# Patient Record
Sex: FEMALE | Race: WHITE | NOT HISPANIC OR LATINO
[De-identification: names, ages, dates, MRNs, and addresses within clinical notes are randomized per-mention and may not be internally consistent; named-entity substitution may affect disease eponyms.]

---

## 2018-11-20 ENCOUNTER — RECORD ABSTRACTING (OUTPATIENT)
Age: 83
End: 2018-11-20

## 2018-11-20 DIAGNOSIS — D50.8 OTHER IRON DEFICIENCY ANEMIAS: ICD-10-CM

## 2018-11-20 DIAGNOSIS — J02.9 ACUTE PHARYNGITIS, UNSPECIFIED: ICD-10-CM

## 2018-11-20 DIAGNOSIS — T62.91XA TOXIC EFFECT OF UNSPECIFIED NOXIOUS SUBSTANCE EATEN AS FOOD, ACCIDENTAL (UNINTENTIONAL), INITIAL ENCOUNTER: ICD-10-CM

## 2018-11-20 DIAGNOSIS — K80.20 CALCULUS OF GALLBLADDER W/OUT CHOLECYSTITIS W/OUT OBSTRUCTION: ICD-10-CM

## 2018-11-20 DIAGNOSIS — Z87.19 PERSONAL HISTORY OF OTHER DISEASES OF THE DIGESTIVE SYSTEM: ICD-10-CM

## 2018-11-20 DIAGNOSIS — M54.5 LOW BACK PAIN: ICD-10-CM

## 2018-11-20 DIAGNOSIS — J18.9 PNEUMONIA, UNSPECIFIED ORGANISM: ICD-10-CM

## 2018-11-20 DIAGNOSIS — M41.9 SCOLIOSIS, UNSPECIFIED: ICD-10-CM

## 2018-11-20 DIAGNOSIS — K80.50 CALCULUS OF BILE DUCT W/OUT CHOLANGITIS OR CHOLECYSTITIS W/OUT OBSTRUCTION: ICD-10-CM

## 2018-11-20 DIAGNOSIS — J38.7 OTHER DISEASES OF LARYNX: ICD-10-CM

## 2018-12-03 ENCOUNTER — RX RENEWAL (OUTPATIENT)
Age: 83
End: 2018-12-03

## 2018-12-12 ENCOUNTER — APPOINTMENT (OUTPATIENT)
Dept: GASTROENTEROLOGY | Facility: CLINIC | Age: 83
End: 2018-12-12

## 2019-03-01 ENCOUNTER — APPOINTMENT (OUTPATIENT)
Dept: INTERNAL MEDICINE | Facility: CLINIC | Age: 84
End: 2019-03-01
Payer: MEDICARE

## 2019-03-01 ENCOUNTER — LABORATORY RESULT (OUTPATIENT)
Age: 84
End: 2019-03-01

## 2019-03-01 VITALS
DIASTOLIC BLOOD PRESSURE: 62 MMHG | TEMPERATURE: 99 F | SYSTOLIC BLOOD PRESSURE: 118 MMHG | HEIGHT: 55 IN | WEIGHT: 90 LBS | BODY MASS INDEX: 20.83 KG/M2

## 2019-03-01 DIAGNOSIS — B34.9 VIRAL INFECTION, UNSPECIFIED: ICD-10-CM

## 2019-03-01 DIAGNOSIS — Z87.09 PERSONAL HISTORY OF OTHER DISEASES OF THE RESPIRATORY SYSTEM: ICD-10-CM

## 2019-03-01 PROCEDURE — 36415 COLL VENOUS BLD VENIPUNCTURE: CPT

## 2019-03-01 PROCEDURE — 99213 OFFICE O/P EST LOW 20 MIN: CPT | Mod: 25

## 2019-03-01 NOTE — PHYSICAL EXAM
[No Acute Distress] : no acute distress [Well Nourished] : well nourished [Well Developed] : well developed [Well-Appearing] : well-appearing [Normal Voice/Communication] : normal voice/communication [Normal Sclera/Conjunctiva] : normal sclera/conjunctiva [PERRL] : pupils equal round and reactive to light [EOMI] : extraocular movements intact [Normal Outer Ear/Nose] : the outer ears and nose were normal in appearance [Normal Oropharynx] : the oropharynx was normal [No JVD] : no jugular venous distention [Supple] : supple [No Lymphadenopathy] : no lymphadenopathy [Thyroid Normal, No Nodules] : the thyroid was normal and there were no nodules present [No Respiratory Distress] : no respiratory distress  [Clear to Auscultation] : lungs were clear to auscultation bilaterally [No Accessory Muscle Use] : no accessory muscle use [Normal Rate] : normal rate  [Regular Rhythm] : with a regular rhythm [Normal S1, S2] : normal S1 and S2 [No Murmur] : no murmur heard [No Carotid Bruits] : no carotid bruits [No Abdominal Bruit] : a ~M bruit was not heard ~T in the abdomen [No Varicosities] : no varicosities [Pedal Pulses Present] : the pedal pulses are present [No Edema] : there was no peripheral edema [No Extremity Clubbing/Cyanosis] : no extremity clubbing/cyanosis [No Palpable Aorta] : no palpable aorta [Soft] : abdomen soft [Non Tender] : non-tender [Non-distended] : non-distended [No Masses] : no abdominal mass palpated [No HSM] : no HSM [Normal Bowel Sounds] : normal bowel sounds [Normal Posterior Cervical Nodes] : no posterior cervical lymphadenopathy [Normal Anterior Cervical Nodes] : no anterior cervical lymphadenopathy [No CVA Tenderness] : no CVA  tenderness [No Spinal Tenderness] : no spinal tenderness [No Joint Swelling] : no joint swelling [Grossly Normal Strength/Tone] : grossly normal strength/tone [No Rash] : no rash [Normal Gait] : normal gait [Coordination Grossly Intact] : coordination grossly intact [No Focal Deficits] : no focal deficits [Deep Tendon Reflexes (DTR)] : deep tendon reflexes were 2+ and symmetric [Normal Affect] : the affect was normal [Normal Insight/Judgement] : insight and judgment were intact [de-identified] : Generalized malaise

## 2019-03-01 NOTE — HISTORY OF PRESENT ILLNESS
[FreeTextEntry8] : Patient is a 89-year-old female presenting with a one-week history of generalized malaise, loss of appetite, and fatigue. She had a temperature of 100 one as recorded by her daughter. In the morning. Today. She had no temperature. The patient feels fatigued and does have a loss of appetite. She's recently been hospitalized and undergone a course of antibiotics for an infected stent.she discharge from the hospital ands doing well until approximatea  ago. She is 89 years old. Completely intact and Very vital

## 2019-03-02 LAB
BASOPHILS # BLD AUTO: 0.03 K/UL
BASOPHILS NFR BLD AUTO: 0.3 %
EOSINOPHIL # BLD AUTO: 0.11 K/UL
EOSINOPHIL NFR BLD AUTO: 1 %
HCT VFR BLD CALC: 34 %
HGB BLD-MCNC: 11 G/DL
IMM GRANULOCYTES NFR BLD AUTO: 0.3 %
LYMPHOCYTES # BLD AUTO: 0.59 K/UL
LYMPHOCYTES NFR BLD AUTO: 5.2 %
MAN DIFF?: NORMAL
MCHC RBC-ENTMCNC: 30.1 PG
MCHC RBC-ENTMCNC: 32.4 GM/DL
MCV RBC AUTO: 92.9 FL
MONOCYTES # BLD AUTO: 1.07 K/UL
MONOCYTES NFR BLD AUTO: 9.4 %
NEUTROPHILS # BLD AUTO: 9.59 K/UL
NEUTROPHILS NFR BLD AUTO: 83.8 %
PLATELET # BLD AUTO: 345 K/UL
RBC # BLD: 3.66 M/UL
RBC # FLD: 13.7 %
WBC # FLD AUTO: 11.42 K/UL

## 2019-03-04 LAB
ALBUMIN MFR SERPL ELPH: 45.5 %
ALBUMIN SERPL ELPH-MCNC: 3.7 G/DL
ALBUMIN SERPL-MCNC: 3.2 G/DL
ALBUMIN/GLOB SERPL: 0.8 RATIO
ALP BLD-CCNC: 122 U/L
ALPHA1 GLOB MFR SERPL ELPH: 10.1 %
ALPHA1 GLOB SERPL ELPH-MCNC: 0.7 G/DL
ALPHA2 GLOB MFR SERPL ELPH: 14.9 %
ALPHA2 GLOB SERPL ELPH-MCNC: 1.1 G/DL
ALT SERPL-CCNC: 17 U/L
ANION GAP SERPL CALC-SCNC: 14 MMOL/L
AST SERPL-CCNC: 17 U/L
B-GLOBULIN MFR SERPL ELPH: 13.6 %
B-GLOBULIN SERPL ELPH-MCNC: 1 G/DL
BACTERIA UR CULT: NORMAL
BILIRUB SERPL-MCNC: 0.3 MG/DL
BUN SERPL-MCNC: 18 MG/DL
CALCIUM SERPL-MCNC: 9.3 MG/DL
CHLORIDE SERPL-SCNC: 95 MMOL/L
CO2 SERPL-SCNC: 26 MMOL/L
CREAT SERPL-MCNC: 0.76 MG/DL
CRP SERPL-MCNC: 27.08 MG/DL
ERYTHROCYTE [SEDIMENTATION RATE] IN BLOOD BY WESTERGREN METHOD: 95 MM/HR
FLU A RESULT: NOT DETECTED
FLU B RESULT: NOT DETECTED
GAMMA GLOB FLD ELPH-MCNC: 1.1 G/DL
GAMMA GLOB MFR SERPL ELPH: 15.9 %
GLUCOSE SERPL-MCNC: 154 MG/DL
INTERPRETATION SERPL IEP-IMP: NORMAL
POTASSIUM SERPL-SCNC: 4.8 MMOL/L
PROT SERPL-MCNC: 7.1 G/DL
RSV RESULT: NOT DETECTED
SODIUM SERPL-SCNC: 135 MMOL/L

## 2019-03-06 LAB
APPEARANCE: ABNORMAL
BILIRUBIN URINE: NEGATIVE
BLOOD URINE: NEGATIVE
COLOR: YELLOW
GLUCOSE QUALITATIVE U: NEGATIVE
KETONES URINE: NEGATIVE
LEUKOCYTE ESTERASE URINE: ABNORMAL
NITRITE URINE: NEGATIVE
PH URINE: 6
PROTEIN URINE: ABNORMAL
SPECIFIC GRAVITY URINE: 1.03
UROBILINOGEN URINE: ABNORMAL

## 2019-03-18 ENCOUNTER — RX RENEWAL (OUTPATIENT)
Age: 84
End: 2019-03-18

## 2019-05-20 ENCOUNTER — RX RENEWAL (OUTPATIENT)
Age: 84
End: 2019-05-20

## 2019-10-01 ENCOUNTER — OTHER (OUTPATIENT)
Age: 84
End: 2019-10-01

## 2019-10-02 ENCOUNTER — LABORATORY RESULT (OUTPATIENT)
Age: 84
End: 2019-10-02

## 2019-10-02 ENCOUNTER — RESULT REVIEW (OUTPATIENT)
Age: 84
End: 2019-10-02

## 2019-10-02 ENCOUNTER — APPOINTMENT (OUTPATIENT)
Dept: INTERNAL MEDICINE | Facility: CLINIC | Age: 84
End: 2019-10-02
Payer: MEDICARE

## 2019-10-02 VITALS
SYSTOLIC BLOOD PRESSURE: 110 MMHG | DIASTOLIC BLOOD PRESSURE: 60 MMHG | HEIGHT: 55 IN | WEIGHT: 90 LBS | BODY MASS INDEX: 20.83 KG/M2

## 2019-10-02 DIAGNOSIS — E86.0 DEHYDRATION: ICD-10-CM

## 2019-10-02 DIAGNOSIS — E78.5 HYPERLIPIDEMIA, UNSPECIFIED: ICD-10-CM

## 2019-10-02 PROCEDURE — 99213 OFFICE O/P EST LOW 20 MIN: CPT | Mod: 25

## 2019-10-02 PROCEDURE — 36415 COLL VENOUS BLD VENIPUNCTURE: CPT

## 2019-10-03 PROBLEM — E86.0 DEHYDRATION: Status: ACTIVE | Noted: 2018-11-20

## 2019-10-03 LAB
ALBUMIN MFR SERPL ELPH: 48.9 %
ALBUMIN SERPL ELPH-MCNC: 3.9 G/DL
ALBUMIN SERPL-MCNC: 3.4 G/DL
ALBUMIN/GLOB SERPL: 1 RATIO
ALP BLD-CCNC: 78 U/L
ALPHA1 GLOB MFR SERPL ELPH: 7.3 %
ALPHA1 GLOB SERPL ELPH-MCNC: 0.5 G/DL
ALPHA2 GLOB MFR SERPL ELPH: 10.9 %
ALPHA2 GLOB SERPL ELPH-MCNC: 0.8 G/DL
ALT SERPL-CCNC: 8 U/L
ANION GAP SERPL CALC-SCNC: 13 MMOL/L
AST SERPL-CCNC: 16 U/L
B-GLOBULIN MFR SERPL ELPH: 13.4 %
B-GLOBULIN SERPL ELPH-MCNC: 0.9 G/DL
BILIRUB SERPL-MCNC: 0.3 MG/DL
BUN SERPL-MCNC: 12 MG/DL
CALCIUM SERPL-MCNC: 9.3 MG/DL
CHLORIDE SERPL-SCNC: 97 MMOL/L
CHOLEST SERPL-MCNC: 162 MG/DL
CHOLEST/HDLC SERPL: 2.7 RATIO
CO2 SERPL-SCNC: 26 MMOL/L
CREAT SERPL-MCNC: 0.71 MG/DL
ERYTHROCYTE [SEDIMENTATION RATE] IN BLOOD BY WESTERGREN METHOD: 90 MM/HR
GAMMA GLOB FLD ELPH-MCNC: 1.3 G/DL
GAMMA GLOB MFR SERPL ELPH: 19.5 %
GLUCOSE SERPL-MCNC: 108 MG/DL
HDLC SERPL-MCNC: 61 MG/DL
INTERPRETATION SERPL IEP-IMP: NORMAL
IRON SATN MFR SERPL: 2 %
IRON SERPL-MCNC: 9 UG/DL
LDLC SERPL CALC-MCNC: 87 MG/DL
POTASSIUM SERPL-SCNC: 4.8 MMOL/L
PROT SERPL-MCNC: 6.9 G/DL
PROT SERPL-MCNC: 6.9 G/DL
PROT SERPL-MCNC: 7 G/DL
SODIUM SERPL-SCNC: 136 MMOL/L
TIBC SERPL-MCNC: 379 UG/DL
TRIGL SERPL-MCNC: 72 MG/DL
UIBC SERPL-MCNC: 370 UG/DL

## 2019-10-03 NOTE — HISTORY OF PRESENT ILLNESS
[FreeTextEntry8] : partient c/o lightheadedness weakness   also dark stools past couple days appears very pale  nl heart rate without postural changes \par appears anemic blood being drawn . pt advised to go to the ER if symptoms persist.

## 2019-10-03 NOTE — PHYSICAL EXAM
[No Acute Distress] : no acute distress [Well Nourished] : well nourished [Well Developed] : well developed [Well-Appearing] : well-appearing [Normal Sclera/Conjunctiva] : normal sclera/conjunctiva [PERRL] : pupils equal round and reactive to light [EOMI] : extraocular movements intact [Normal Outer Ear/Nose] : the outer ears and nose were normal in appearance [Normal Oropharynx] : the oropharynx was normal [No JVD] : no jugular venous distention [No Lymphadenopathy] : no lymphadenopathy [Supple] : supple [Thyroid Normal, No Nodules] : the thyroid was normal and there were no nodules present [No Respiratory Distress] : no respiratory distress  [No Accessory Muscle Use] : no accessory muscle use [Clear to Auscultation] : lungs were clear to auscultation bilaterally [Normal Rate] : normal rate  [Regular Rhythm] : with a regular rhythm [Normal S1, S2] : normal S1 and S2 [No Murmur] : no murmur heard [No Carotid Bruits] : no carotid bruits [No Abdominal Bruit] : a ~M bruit was not heard ~T in the abdomen [No Varicosities] : no varicosities [Pedal Pulses Present] : the pedal pulses are present [No Edema] : there was no peripheral edema [No Palpable Aorta] : no palpable aorta [No Extremity Clubbing/Cyanosis] : no extremity clubbing/cyanosis [Soft] : abdomen soft [Non Tender] : non-tender [Non-distended] : non-distended [No Masses] : no abdominal mass palpated [Normal Bowel Sounds] : normal bowel sounds [No HSM] : no HSM [Normal Posterior Cervical Nodes] : no posterior cervical lymphadenopathy [Normal Anterior Cervical Nodes] : no anterior cervical lymphadenopathy [No CVA Tenderness] : no CVA  tenderness [No Spinal Tenderness] : no spinal tenderness [No Joint Swelling] : no joint swelling [Grossly Normal Strength/Tone] : grossly normal strength/tone [No Rash] : no rash [Coordination Grossly Intact] : coordination grossly intact [No Focal Deficits] : no focal deficits [Normal Gait] : normal gait [Deep Tendon Reflexes (DTR)] : deep tendon reflexes were 2+ and symmetric [Normal Affect] : the affect was normal [Normal Insight/Judgement] : insight and judgment were intact [de-identified] : kaylyn mohamudction

## 2019-10-04 ENCOUNTER — APPOINTMENT (OUTPATIENT)
Dept: GASTROENTEROLOGY | Facility: HOSPITAL | Age: 84
End: 2019-10-04

## 2019-10-22 ENCOUNTER — APPOINTMENT (OUTPATIENT)
Dept: INTERNAL MEDICINE | Facility: CLINIC | Age: 84
End: 2019-10-22
Payer: MEDICARE

## 2019-10-22 VITALS
WEIGHT: 90 LBS | DIASTOLIC BLOOD PRESSURE: 60 MMHG | HEIGHT: 55 IN | SYSTOLIC BLOOD PRESSURE: 100 MMHG | BODY MASS INDEX: 20.83 KG/M2

## 2019-10-22 DIAGNOSIS — D50.0 IRON DEFICIENCY ANEMIA SECONDARY TO BLOOD LOSS (CHRONIC): ICD-10-CM

## 2019-10-22 PROCEDURE — 99213 OFFICE O/P EST LOW 20 MIN: CPT

## 2019-10-22 RX ORDER — FERROUS SULFATE 325(65) MG
325 (65 FE) TABLET ORAL
Refills: 0 | Status: ACTIVE | COMMUNITY

## 2019-10-22 NOTE — HISTORY OF PRESENT ILLNESS
[FreeTextEntry8] : Patient is presenting at the age of 89 after having been hospitalized with a profound iron deficiency anemia. She had an upper and lower endoscopy, which was largely unrevealing. She has a past history of severe anemia and GI bleeding. At one point in time obviously, upper GI with reflux esophagitis. This time. There was no evidence of same. At this time. She has no shortness of breath. Her sense of well-being is excellent. Her appetite is good. She is eating well. Her bowel movements are normal. She's taking iron twice a day. She was transfused during her last hospitalization. She'll followup for repeat CBC and iron studies in approximately a month.

## 2019-10-22 NOTE — PHYSICAL EXAM
[No Acute Distress] : no acute distress [Well Nourished] : well nourished [Well Developed] : well developed [Well-Appearing] : well-appearing [Normal Sclera/Conjunctiva] : normal sclera/conjunctiva [PERRL] : pupils equal round and reactive to light [EOMI] : extraocular movements intact [Normal Outer Ear/Nose] : the outer ears and nose were normal in appearance [Normal Oropharynx] : the oropharynx was normal [No JVD] : no jugular venous distention [No Lymphadenopathy] : no lymphadenopathy [Supple] : supple [No Respiratory Distress] : no respiratory distress  [Thyroid Normal, No Nodules] : the thyroid was normal and there were no nodules present [No Accessory Muscle Use] : no accessory muscle use [Clear to Auscultation] : lungs were clear to auscultation bilaterally [Normal Rate] : normal rate  [Regular Rhythm] : with a regular rhythm [Normal S1, S2] : normal S1 and S2 [No Murmur] : no murmur heard [No Carotid Bruits] : no carotid bruits [No Abdominal Bruit] : a ~M bruit was not heard ~T in the abdomen [No Varicosities] : no varicosities [Pedal Pulses Present] : the pedal pulses are present [No Edema] : there was no peripheral edema [No Palpable Aorta] : no palpable aorta [No Extremity Clubbing/Cyanosis] : no extremity clubbing/cyanosis [Soft] : abdomen soft [Non Tender] : non-tender [Non-distended] : non-distended [No Masses] : no abdominal mass palpated [No HSM] : no HSM [Normal Bowel Sounds] : normal bowel sounds [Normal Posterior Cervical Nodes] : no posterior cervical lymphadenopathy [Normal Anterior Cervical Nodes] : no anterior cervical lymphadenopathy [No CVA Tenderness] : no CVA  tenderness [No Spinal Tenderness] : no spinal tenderness [No Joint Swelling] : no joint swelling [Grossly Normal Strength/Tone] : grossly normal strength/tone [No Rash] : no rash [Coordination Grossly Intact] : coordination grossly intact [No Focal Deficits] : no focal deficits [Normal Gait] : normal gait [Normal Affect] : the affect was normal [Deep Tendon Reflexes (DTR)] : deep tendon reflexes were 2+ and symmetric [Normal Insight/Judgement] : insight and judgment were intact

## 2019-10-28 ENCOUNTER — CLINICAL ADVICE (OUTPATIENT)
Age: 84
End: 2019-10-28

## 2020-01-22 ENCOUNTER — APPOINTMENT (OUTPATIENT)
Dept: INTERNAL MEDICINE | Facility: CLINIC | Age: 85
End: 2020-01-22

## 2020-01-24 ENCOUNTER — TRANSCRIPTION ENCOUNTER (OUTPATIENT)
Age: 85
End: 2020-01-24

## 2020-05-04 ENCOUNTER — RX RENEWAL (OUTPATIENT)
Age: 85
End: 2020-05-04

## 2020-05-15 ENCOUNTER — RX RENEWAL (OUTPATIENT)
Age: 85
End: 2020-05-15

## 2020-05-17 ENCOUNTER — RX CHANGE (OUTPATIENT)
Age: 85
End: 2020-05-17

## 2020-06-11 RX ORDER — ESOMEPRAZOLE MAGNESIUM 20 MG/1
20 CAPSULE, DELAYED RELEASE ORAL TWICE DAILY
Qty: 180 | Refills: 3 | Status: ACTIVE | COMMUNITY
Start: 2020-05-17 | End: 1900-01-01

## 2020-10-27 ENCOUNTER — APPOINTMENT (OUTPATIENT)
Dept: INTERNAL MEDICINE | Facility: CLINIC | Age: 85
End: 2020-10-27
Payer: MEDICARE

## 2020-10-27 VITALS
BODY MASS INDEX: 20.83 KG/M2 | SYSTOLIC BLOOD PRESSURE: 102 MMHG | HEIGHT: 55 IN | DIASTOLIC BLOOD PRESSURE: 58 MMHG | WEIGHT: 90 LBS

## 2020-10-27 DIAGNOSIS — Z01.818 ENCOUNTER FOR OTHER PREPROCEDURAL EXAMINATION: ICD-10-CM

## 2020-10-27 DIAGNOSIS — R49.0 DYSPHONIA: ICD-10-CM

## 2020-10-27 PROCEDURE — 99072 ADDL SUPL MATRL&STAF TM PHE: CPT

## 2020-10-27 PROCEDURE — 99214 OFFICE O/P EST MOD 30 MIN: CPT | Mod: 25

## 2020-10-27 NOTE — REVIEW OF SYSTEMS
[Constipation] : constipation [Back Pain] : back pain [Negative] : Heme/Lymph [FreeTextEntry7] : chronic constipation

## 2020-10-27 NOTE — HISTORY OF PRESENT ILLNESS
[FreeTextEntry8] : Patient is a 89-year-old female presenting for surgical clearance for kyphoplasty secondary osteoporosis. Patient is healthy and has been for years. On her present blood work shows a mild thrombocytopenia, probably related to platelet clumping. Her hematocrit is, good. She's been on iron deficient in the past. She has a problem with chronic constipation , particularly if given analgesics like tramadol at this point in time. Her bowels are in relatively good shape. The patient at this time is pretty much wheelchair ridden and in relatively frequent pain. And, should have a good response from the procedure. She is not a candidate for general anesthesia, but certainly given the relatively noninvasive nature of the procedure. She should do well and is surgically cleared. Her blood work is good. Her EKG demonstrates short MT interval, but this is long-standing without other changes. A urine culture is being sent.

## 2020-10-27 NOTE — PHYSICAL EXAM
[No Acute Distress] : no acute distress [Well Nourished] : well nourished [Well Developed] : well developed [Well-Appearing] : well-appearing [Normal Sclera/Conjunctiva] : normal sclera/conjunctiva [PERRL] : pupils equal round and reactive to light [EOMI] : extraocular movements intact [Normal Outer Ear/Nose] : the outer ears and nose were normal in appearance [Normal Oropharynx] : the oropharynx was normal [No JVD] : no jugular venous distention [No Lymphadenopathy] : no lymphadenopathy [Supple] : supple [Thyroid Normal, No Nodules] : the thyroid was normal and there were no nodules present [No Respiratory Distress] : no respiratory distress  [No Accessory Muscle Use] : no accessory muscle use [Clear to Auscultation] : lungs were clear to auscultation bilaterally [Normal Rate] : normal rate  [Regular Rhythm] : with a regular rhythm [Normal S1, S2] : normal S1 and S2 [No Murmur] : no murmur heard [No Carotid Bruits] : no carotid bruits [No Abdominal Bruit] : a ~M bruit was not heard ~T in the abdomen [No Varicosities] : no varicosities [Pedal Pulses Present] : the pedal pulses are present [No Edema] : there was no peripheral edema [No Palpable Aorta] : no palpable aorta [No Extremity Clubbing/Cyanosis] : no extremity clubbing/cyanosis [Soft] : abdomen soft [Non Tender] : non-tender [Non-distended] : non-distended [No Masses] : no abdominal mass palpated [No HSM] : no HSM [Normal Bowel Sounds] : normal bowel sounds [Normal Posterior Cervical Nodes] : no posterior cervical lymphadenopathy [Normal Anterior Cervical Nodes] : no anterior cervical lymphadenopathy [No CVA Tenderness] : no CVA  tenderness [No Spinal Tenderness] : no spinal tenderness [No Joint Swelling] : no joint swelling [Grossly Normal Strength/Tone] : grossly normal strength/tone [No Rash] : no rash [Coordination Grossly Intact] : coordination grossly intact [No Focal Deficits] : no focal deficits [Normal Gait] : normal gait [Normal Affect] : the affect was normal [Normal Insight/Judgement] : insight and judgment were intact [de-identified] : Thoracic kyphosis [de-identified] : Mild cogwheeling right arm

## 2020-10-27 NOTE — ASSESSMENT
[FreeTextEntry1] : Patient's 89-year-old female presenting for surgical clearance. 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  postoperative analgesics should be limited to none opioid medications secondary to debilitating constipation

## 2020-10-28 LAB
APPEARANCE: CLEAR
BILIRUBIN URINE: NEGATIVE
BLOOD URINE: NEGATIVE
COLOR: NORMAL
GLUCOSE QUALITATIVE U: NEGATIVE
KETONES URINE: NEGATIVE
LEUKOCYTE ESTERASE URINE: ABNORMAL
NITRITE URINE: NEGATIVE
PH URINE: 6.5
PROTEIN URINE: NEGATIVE
SPECIFIC GRAVITY URINE: 1.01
UROBILINOGEN URINE: NORMAL

## 2020-10-29 LAB — BACTERIA UR CULT: NORMAL

## 2020-12-08 ENCOUNTER — APPOINTMENT (OUTPATIENT)
Dept: INTERNAL MEDICINE | Facility: CLINIC | Age: 85
End: 2020-12-08

## 2020-12-14 ENCOUNTER — APPOINTMENT (OUTPATIENT)
Dept: INTERNAL MEDICINE | Facility: CLINIC | Age: 85
End: 2020-12-14
Payer: MEDICARE

## 2020-12-14 VITALS
DIASTOLIC BLOOD PRESSURE: 68 MMHG | HEIGHT: 55 IN | SYSTOLIC BLOOD PRESSURE: 112 MMHG | WEIGHT: 90 LBS | BODY MASS INDEX: 20.83 KG/M2

## 2020-12-14 PROCEDURE — 99072 ADDL SUPL MATRL&STAF TM PHE: CPT

## 2020-12-14 PROCEDURE — 99214 OFFICE O/P EST MOD 30 MIN: CPT

## 2020-12-14 NOTE — PHYSICAL EXAM
[No Acute Distress] : no acute distress [Well Nourished] : well nourished [Well Developed] : well developed [Well-Appearing] : well-appearing [Normal Sclera/Conjunctiva] : normal sclera/conjunctiva [PERRL] : pupils equal round and reactive to light [EOMI] : extraocular movements intact [Normal Outer Ear/Nose] : the outer ears and nose were normal in appearance [Normal Oropharynx] : the oropharynx was normal [No JVD] : no jugular venous distention [No Lymphadenopathy] : no lymphadenopathy [Supple] : supple [Thyroid Normal, No Nodules] : the thyroid was normal and there were no nodules present [No Respiratory Distress] : no respiratory distress  [No Accessory Muscle Use] : no accessory muscle use [Clear to Auscultation] : lungs were clear to auscultation bilaterally [Normal Rate] : normal rate  [Regular Rhythm] : with a regular rhythm [Normal S1, S2] : normal S1 and S2 [No Murmur] : no murmur heard [No Carotid Bruits] : no carotid bruits [No Abdominal Bruit] : a ~M bruit was not heard ~T in the abdomen [No Varicosities] : no varicosities [Pedal Pulses Present] : the pedal pulses are present [No Edema] : there was no peripheral edema [No Palpable Aorta] : no palpable aorta [No Extremity Clubbing/Cyanosis] : no extremity clubbing/cyanosis [Soft] : abdomen soft [Non Tender] : non-tender [Non-distended] : non-distended [No Masses] : no abdominal mass palpated [No HSM] : no HSM [Normal Bowel Sounds] : normal bowel sounds [Normal Posterior Cervical Nodes] : no posterior cervical lymphadenopathy [Normal Anterior Cervical Nodes] : no anterior cervical lymphadenopathy [No CVA Tenderness] : no CVA  tenderness [No Spinal Tenderness] : no spinal tenderness [No Joint Swelling] : no joint swelling [Grossly Normal Strength/Tone] : grossly normal strength/tone [No Rash] : no rash [Coordination Grossly Intact] : coordination grossly intact [No Focal Deficits] : no focal deficits [Normal Gait] : normal gait [Normal Affect] : the affect was normal [Normal Insight/Judgement] : insight and judgment were intact [de-identified] : Thoracic kyphosis [de-identified] : Mild cogwheeling right arm

## 2020-12-14 NOTE — ASSESSMENT
[FreeTextEntry4] : Patient is free of infectious disease, cardiac and pulmonary status are stable. Blood work is excellent. Patient cleared for anticipated procedure.

## 2020-12-14 NOTE — HISTORY OF PRESENT ILLNESS
[FreeTextEntry1] : kyphoplasty [FreeTextEntry4] : Patient is a 89-year-old female presented for surgical clearance for a kyphoplasty. She has an extremely pain for compression fracture that has markedly limited. Both her ability to move and is related to relatively marked deterioration in her quality of life. She's elected to have a kyphoplasty which probably will work very well. Her MRI strongly suggested that he could she subsequently had slipped out of bed. Once and has had increased pain, but probably not, change. She's presently being reevaluated with a second MRI prior to surgery. At this time. She appears to be free of infectious disease, pulmonary and cardiac status were remarkably good as is her blood work. Patient is cleared for the anticipated procedure.

## 2020-12-16 ENCOUNTER — APPOINTMENT (OUTPATIENT)
Dept: INTERNAL MEDICINE | Facility: CLINIC | Age: 85
End: 2020-12-16

## 2020-12-21 PROBLEM — Z87.09 HISTORY OF UPPER RESPIRATORY INFECTION: Status: RESOLVED | Noted: 2018-11-20 | Resolved: 2020-12-21

## 2021-03-30 ENCOUNTER — NON-APPOINTMENT (OUTPATIENT)
Age: 86
End: 2021-03-30

## 2021-03-30 ENCOUNTER — APPOINTMENT (OUTPATIENT)
Dept: FAMILY MEDICINE | Facility: CLINIC | Age: 86
End: 2021-03-30
Payer: MEDICARE

## 2021-03-30 VITALS
RESPIRATION RATE: 16 BRPM | WEIGHT: 88 LBS | BODY MASS INDEX: 20.37 KG/M2 | OXYGEN SATURATION: 97 % | HEART RATE: 97 BPM | DIASTOLIC BLOOD PRESSURE: 70 MMHG | HEIGHT: 55 IN | SYSTOLIC BLOOD PRESSURE: 108 MMHG

## 2021-03-30 PROCEDURE — 99213 OFFICE O/P EST LOW 20 MIN: CPT

## 2021-03-30 PROCEDURE — 99072 ADDL SUPL MATRL&STAF TM PHE: CPT

## 2021-03-30 RX ORDER — ALBUTEROL SULFATE 90 UG/1
108 (90 BASE) POWDER, METERED RESPIRATORY (INHALATION) EVERY 6 HOURS
Qty: 1 | Refills: 0 | Status: ACTIVE | COMMUNITY
Start: 2021-03-30 | End: 1900-01-01

## 2021-04-01 NOTE — HISTORY OF PRESENT ILLNESS
[FreeTextEntry8] : 89 yo F seeking to meet new doctor and establish care as previous doctor now retiring, Dr. Jaime. Additionally, patient accompanied by daughter who reports that during her physical therapy sessions, patient develops mild wheezing at the end of sessions. Patient currently denies shortness of breath, chest pain, nausea/vomiting, night time symptoms or cough at any other instances. Wheezing does not affect her physical capacity to perform physical thearpy. Denies any dysphagia. Mild subjective wheezing during physical therapy.

## 2021-04-29 ENCOUNTER — RX RENEWAL (OUTPATIENT)
Age: 86
End: 2021-04-29

## 2021-06-03 ENCOUNTER — RX RENEWAL (OUTPATIENT)
Age: 86
End: 2021-06-03

## 2021-06-03 RX ORDER — ALBUTEROL SULFATE 90 UG/1
108 (90 BASE) INHALANT RESPIRATORY (INHALATION)
Qty: 1 | Refills: 0 | Status: ACTIVE | COMMUNITY
Start: 2021-03-30 | End: 1900-01-01

## 2021-06-06 ENCOUNTER — RX RENEWAL (OUTPATIENT)
Age: 86
End: 2021-06-06

## 2021-12-23 ENCOUNTER — NON-APPOINTMENT (OUTPATIENT)
Age: 86
End: 2021-12-23

## 2021-12-27 DIAGNOSIS — R26.9 UNSPECIFIED ABNORMALITIES OF GAIT AND MOBILITY: ICD-10-CM

## 2022-01-01 ENCOUNTER — APPOINTMENT (OUTPATIENT)
Dept: GERIATRICS | Facility: HOME HEALTH | Age: 87
End: 2022-01-01

## 2022-01-01 ENCOUNTER — LABORATORY RESULT (OUTPATIENT)
Age: 87
End: 2022-01-01

## 2022-01-01 ENCOUNTER — RX RENEWAL (OUTPATIENT)
Age: 87
End: 2022-01-01

## 2022-01-01 ENCOUNTER — APPOINTMENT (OUTPATIENT)
Dept: GERIATRICS | Facility: CLINIC | Age: 87
End: 2022-01-01
Payer: MEDICARE

## 2022-01-01 ENCOUNTER — APPOINTMENT (OUTPATIENT)
Dept: GERIATRICS | Facility: HOME HEALTH | Age: 87
End: 2022-01-01
Payer: MEDICARE

## 2022-01-01 ENCOUNTER — RESULT REVIEW (OUTPATIENT)
Age: 87
End: 2022-01-01

## 2022-01-01 ENCOUNTER — NON-APPOINTMENT (OUTPATIENT)
Age: 87
End: 2022-01-01

## 2022-01-01 ENCOUNTER — APPOINTMENT (OUTPATIENT)
Dept: GERIATRICS | Facility: CLINIC | Age: 87
End: 2022-01-01

## 2022-01-01 VITALS
TEMPERATURE: 98.7 F | HEART RATE: 90 BPM | SYSTOLIC BLOOD PRESSURE: 130 MMHG | OXYGEN SATURATION: 95 % | DIASTOLIC BLOOD PRESSURE: 80 MMHG | RESPIRATION RATE: 18 BRPM

## 2022-01-01 VITALS
OXYGEN SATURATION: 92 % | SYSTOLIC BLOOD PRESSURE: 128 MMHG | HEART RATE: 88 BPM | TEMPERATURE: 98 F | DIASTOLIC BLOOD PRESSURE: 78 MMHG | RESPIRATION RATE: 18 BRPM

## 2022-01-01 VITALS — RESPIRATION RATE: 30 BRPM | OXYGEN SATURATION: 92 %

## 2022-01-01 DIAGNOSIS — M62.830 MUSCLE SPASM OF BACK: ICD-10-CM

## 2022-01-01 DIAGNOSIS — R35.0 FREQUENCY OF MICTURITION: ICD-10-CM

## 2022-01-01 DIAGNOSIS — R06.2 WHEEZING: ICD-10-CM

## 2022-01-01 DIAGNOSIS — G20 PARKINSON'S DISEASE: ICD-10-CM

## 2022-01-01 DIAGNOSIS — R30.0 DYSURIA: ICD-10-CM

## 2022-01-01 DIAGNOSIS — K64.9 UNSPECIFIED HEMORRHOIDS: ICD-10-CM

## 2022-01-01 DIAGNOSIS — J18.9 PNEUMONIA, UNSPECIFIED ORGANISM: ICD-10-CM

## 2022-01-01 DIAGNOSIS — K59.09 OTHER CONSTIPATION: ICD-10-CM

## 2022-01-01 DIAGNOSIS — R41.0 DISORIENTATION, UNSPECIFIED: ICD-10-CM

## 2022-01-01 DIAGNOSIS — R63.0 ANOREXIA: ICD-10-CM

## 2022-01-01 DIAGNOSIS — N32.81 OVERACTIVE BLADDER: ICD-10-CM

## 2022-01-01 DIAGNOSIS — S81.802A UNSPECIFIED OPEN WOUND, LEFT LOWER LEG, INITIAL ENCOUNTER: ICD-10-CM

## 2022-01-01 DIAGNOSIS — L03.818 CELLULITIS OF OTHER SITES: ICD-10-CM

## 2022-01-01 DIAGNOSIS — T14.8XXA OTHER INJURY OF UNSPECIFIED BODY REGION, INITIAL ENCOUNTER: ICD-10-CM

## 2022-01-01 PROCEDURE — 99350 HOME/RES VST EST HIGH MDM 60: CPT

## 2022-01-01 PROCEDURE — 99214 OFFICE O/P EST MOD 30 MIN: CPT | Mod: 95

## 2022-01-01 PROCEDURE — 99349 HOME/RES VST EST MOD MDM 40: CPT

## 2022-01-01 PROCEDURE — 99354: CPT

## 2022-01-01 PROCEDURE — 99449 NTRPROF PH1/NTRNET/EHR 31/>: CPT

## 2022-01-01 PROCEDURE — 99442: CPT

## 2022-01-01 RX ORDER — MONTELUKAST 10 MG/1
10 TABLET, FILM COATED ORAL
Qty: 1 | Refills: 5 | Status: ACTIVE | COMMUNITY
Start: 2022-02-04 | End: 1900-01-01

## 2022-01-01 RX ORDER — SENNOSIDES 8.6 MG/1
8.6 TABLET ORAL
Qty: 30 | Refills: 5 | Status: ACTIVE | COMMUNITY
Start: 2022-01-01 | End: 1900-01-01

## 2022-01-01 RX ORDER — FLUTICASONE PROPIONATE 50 UG/1
50 SPRAY, METERED NASAL TWICE DAILY
Qty: 1 | Refills: 5 | Status: ACTIVE | COMMUNITY
Start: 2022-01-24 | End: 1900-01-01

## 2022-01-01 RX ORDER — SULFAMETHOXAZOLE AND TRIMETHOPRIM 800; 160 MG/1; MG/1
800-160 TABLET ORAL TWICE DAILY
Qty: 14 | Refills: 0 | Status: DISCONTINUED | COMMUNITY
Start: 2022-01-01 | End: 2022-01-01

## 2022-01-01 RX ORDER — URSODIOL 300 MG/1
300 CAPSULE ORAL
Qty: 180 | Refills: 3 | Status: ACTIVE | COMMUNITY
Start: 2019-03-18 | End: 1900-01-01

## 2022-01-01 RX ORDER — LIDOCAINE AND PRILOCAINE 25; 25 MG/G; MG/G
2.5-2.5 CREAM TOPICAL
Qty: 2 | Refills: 2 | Status: ACTIVE | COMMUNITY
Start: 2022-01-01 | End: 1900-01-01

## 2022-01-01 RX ORDER — CYCLOBENZAPRINE HYDROCHLORIDE 5 MG/1
5 TABLET, FILM COATED ORAL
Qty: 15 | Refills: 0 | Status: DISCONTINUED | COMMUNITY
Start: 2021-07-30 | End: 2022-01-01

## 2022-01-01 RX ORDER — CEFPODOXIME PROXETIL 100 MG/1
100 TABLET, FILM COATED ORAL TWICE DAILY
Qty: 10 | Refills: 0 | Status: DISCONTINUED | COMMUNITY
Start: 2022-01-01 | End: 2022-01-01

## 2022-01-01 RX ORDER — AZITHROMYCIN 500 MG/1
500 TABLET, FILM COATED ORAL DAILY
Qty: 1 | Refills: 1 | Status: DISCONTINUED | COMMUNITY
Start: 2022-01-25 | End: 2022-01-01

## 2022-01-01 RX ORDER — FAMOTIDINE 20 MG/1
20 TABLET, FILM COATED ORAL DAILY
Qty: 30 | Refills: 5 | Status: ACTIVE | COMMUNITY
Start: 2022-01-01 | End: 1900-01-01

## 2022-01-01 RX ORDER — AMOXICILLIN AND CLAVULANATE POTASSIUM 875; 125 MG/1; MG/1
875-125 TABLET, COATED ORAL
Qty: 14 | Refills: 0 | Status: DISCONTINUED | COMMUNITY
Start: 2022-01-01 | End: 2022-01-01

## 2022-01-01 RX ORDER — MUPIROCIN 2 G/100G
2 CREAM TOPICAL
Qty: 30 | Refills: 0 | Status: ACTIVE | COMMUNITY
Start: 2022-01-01

## 2022-01-01 RX ORDER — MUPIROCIN 20 MG/G
2 OINTMENT TOPICAL
Qty: 2 | Refills: 5 | Status: ACTIVE | COMMUNITY
Start: 2022-01-01 | End: 1900-01-01

## 2022-01-01 RX ORDER — AZITHROMYCIN 250 MG/1
250 TABLET, FILM COATED ORAL
Qty: 1 | Refills: 0 | Status: DISCONTINUED | COMMUNITY
Start: 2022-01-01 | End: 2022-01-01

## 2022-01-01 RX ORDER — SULFAMETHOXAZOLE AND TRIMETHOPRIM 800; 160 MG/1; MG/1
800-160 TABLET ORAL TWICE DAILY
Qty: 14 | Refills: 0 | Status: DISCONTINUED | COMMUNITY
Start: 2022-03-01 | End: 2022-01-01

## 2022-01-01 RX ORDER — FUROSEMIDE 20 MG/1
20 TABLET ORAL
Qty: 30 | Refills: 2 | Status: ACTIVE | COMMUNITY
Start: 2022-01-01 | End: 1900-01-01

## 2022-01-01 RX ORDER — GUAIFENESIN 400 MG/1
400 TABLET ORAL
Qty: 180 | Refills: 3 | Status: ACTIVE | COMMUNITY
Start: 2022-01-01 | End: 1900-01-01

## 2022-01-01 RX ORDER — DIAZEPAM 2 MG/1
2 TABLET ORAL AT BEDTIME
Qty: 15 | Refills: 0 | Status: ACTIVE | COMMUNITY
Start: 2022-01-01 | End: 1900-01-01

## 2022-01-01 RX ORDER — HYDROCORTISONE 25 MG/G
2.5 CREAM TOPICAL
Qty: 30 | Refills: 3 | Status: ACTIVE | COMMUNITY
Start: 2022-01-01 | End: 1900-01-01

## 2022-01-01 RX ORDER — CEFPODOXIME PROXETIL 200 MG/1
200 TABLET, FILM COATED ORAL DAILY
Qty: 5 | Refills: 0 | Status: DISCONTINUED | COMMUNITY
Start: 2022-01-01 | End: 2022-01-01

## 2022-01-01 RX ORDER — TRAMADOL HYDROCHLORIDE 50 MG/1
50 TABLET, COATED ORAL
Qty: 15 | Refills: 0 | Status: DISCONTINUED | COMMUNITY
Start: 2022-02-04 | End: 2022-01-01

## 2022-01-01 RX ORDER — LEVOFLOXACIN 750 MG/1
750 TABLET, FILM COATED ORAL DAILY
Qty: 5 | Refills: 0 | Status: ACTIVE | COMMUNITY
Start: 2022-01-01 | End: 1900-01-01

## 2022-01-01 RX ORDER — CARVEDILOL 3.12 MG/1
3.12 TABLET, FILM COATED ORAL TWICE DAILY
Qty: 60 | Refills: 0 | Status: ACTIVE | COMMUNITY
Start: 2022-01-01 | End: 1900-01-01

## 2022-01-03 ENCOUNTER — NON-APPOINTMENT (OUTPATIENT)
Age: 87
End: 2022-01-03

## 2022-01-08 RX ORDER — ALBUTEROL SULFATE 2.5 MG/3ML
(2.5 MG/3ML) SOLUTION RESPIRATORY (INHALATION) EVERY 6 HOURS
Qty: 1 | Refills: 2 | Status: ACTIVE | COMMUNITY
Start: 2022-01-08 | End: 1900-01-01

## 2022-01-24 DIAGNOSIS — R09.81 NASAL CONGESTION: ICD-10-CM

## 2022-01-24 RX ORDER — FLUTICASONE PROPIONATE AND SALMETEROL 250; 50 UG/1; UG/1
250-50 POWDER RESPIRATORY (INHALATION)
Qty: 1 | Refills: 1 | Status: ACTIVE | COMMUNITY
Start: 2022-01-24 | End: 1900-01-01

## 2022-01-25 ENCOUNTER — APPOINTMENT (OUTPATIENT)
Dept: PULMONOLOGY | Facility: CLINIC | Age: 87
End: 2022-01-25
Payer: MEDICARE

## 2022-01-25 ENCOUNTER — NON-APPOINTMENT (OUTPATIENT)
Age: 87
End: 2022-01-25

## 2022-01-25 PROCEDURE — 99202 OFFICE O/P NEW SF 15 MIN: CPT | Mod: 95

## 2022-01-25 NOTE — HISTORY OF PRESENT ILLNESS
[Home] : at home, [unfilled] , at the time of the visit. [Medical Office: ([facility])___] : at the medical office located in  [Family Member] : family member [Verbal consent obtained from patient] : the patient, [unfilled] [FreeTextEntry1] : I was asked to consult on this 89 y/o F pt for COVID-19 infection. \par Patient's daughter also present. Patient was potentially exposed on 1/1, began showing sx 1/4-5 and finally tested + on 1/8-10. Daughter attests the patient had briefly improved but has now regressed. Daughter reports patient's O2 levels are down around 85-86% and she is getting a lot of mucus and fatigue. Daughter cannot tell if the nebulizer is helpful. Patient has been trying to cough up her phlegm the last couple days, and even w/ Mucinex she has been mostly unable to expectorate any. She was also given Flonase, Emergen-C, and Advair. She is still eating and drinking even though her appetite has been diminished. \par \par Plan: Start Zithro 1 500 mg TAB 3x/day. O2 being dlelivers ASAP, ? more bacterial superinfection than COVID itself.

## 2022-01-26 ENCOUNTER — NON-APPOINTMENT (OUTPATIENT)
Age: 87
End: 2022-01-26

## 2022-01-27 ENCOUNTER — NON-APPOINTMENT (OUTPATIENT)
Age: 87
End: 2022-01-27

## 2022-01-28 ENCOUNTER — NON-APPOINTMENT (OUTPATIENT)
Age: 87
End: 2022-01-28

## 2022-01-31 ENCOUNTER — NON-APPOINTMENT (OUTPATIENT)
Age: 87
End: 2022-01-31

## 2022-02-01 ENCOUNTER — NON-APPOINTMENT (OUTPATIENT)
Age: 87
End: 2022-02-01

## 2022-02-02 ENCOUNTER — NON-APPOINTMENT (OUTPATIENT)
Age: 87
End: 2022-02-02

## 2022-02-04 ENCOUNTER — APPOINTMENT (OUTPATIENT)
Dept: GERIATRICS | Facility: CLINIC | Age: 87
End: 2022-02-04
Payer: MEDICARE

## 2022-02-04 ENCOUNTER — TRANSCRIPTION ENCOUNTER (OUTPATIENT)
Age: 87
End: 2022-02-04

## 2022-02-04 ENCOUNTER — APPOINTMENT (OUTPATIENT)
Dept: PULMONOLOGY | Facility: CLINIC | Age: 87
End: 2022-02-04
Payer: MEDICARE

## 2022-02-04 VITALS — OXYGEN SATURATION: 89 %

## 2022-02-04 DIAGNOSIS — U07.1 COVID-19: ICD-10-CM

## 2022-02-04 DIAGNOSIS — J12.82 COVID-19: ICD-10-CM

## 2022-02-04 DIAGNOSIS — M48.54XA COLLAPSED VERTEBRA, NOT ELSEWHERE CLASSIFIED, THORACIC REGION, INITIAL ENCOUNTER FOR FRACTURE: ICD-10-CM

## 2022-02-04 PROCEDURE — 99211 OFF/OP EST MAY X REQ PHY/QHP: CPT | Mod: 95

## 2022-02-04 PROCEDURE — 99205 OFFICE O/P NEW HI 60 MIN: CPT | Mod: 95

## 2022-02-04 RX ORDER — IPRATROPIUM BROMIDE AND ALBUTEROL SULFATE 2.5; .5 MG/3ML; MG/3ML
0.5-2.5 (3) SOLUTION RESPIRATORY (INHALATION) 3 TIMES DAILY
Qty: 2 | Refills: 5 | Status: ACTIVE | COMMUNITY
Start: 2022-02-04 | End: 1900-01-01

## 2022-02-04 NOTE — HISTORY OF PRESENT ILLNESS
[Home] : at home, [unfilled] , at the time of the visit. [Medical Office: ([facility])___] : at the medical office located in  [Verbal consent obtained from patient] : the patient, [unfilled] [FreeTextEntry1] : Patient and her daughter return on a telehealth f/u for COVID-19 infection. Daughter believes the patient has improved after taking the Zithro despite still having mild cough and mucus. Patient is still on O2. Daughter has noticed the patient's appetite and energy have went "downhill" since having COVID. \par \par Plan: Add Atrovent to albuterol. Start Singulair. If her congestion or her sats do not improve, I'll consider adding Pulmicort. Continue O2 wean as tolerated. I agree w/ PT. She needs to be mobilized in order to clear her secretions and prevent muscle stiffness and tightness.

## 2022-02-07 PROBLEM — U07.1 PNEUMONIA DUE TO COVID-19 VIRUS: Status: ACTIVE | Noted: 2022-01-25

## 2022-02-07 PROBLEM — M48.54XA COMPRESSION FRACTURE OF THORACIC SPINE, NON-TRAUMATIC: Status: ACTIVE | Noted: 2020-10-27

## 2022-02-07 RX ORDER — MIRABEGRON 25 MG/1
25 TABLET, FILM COATED, EXTENDED RELEASE ORAL
Qty: 30 | Refills: 3 | Status: ACTIVE | COMMUNITY
Start: 2022-02-07 | End: 1900-01-01

## 2022-02-07 NOTE — ASSESSMENT
[FreeTextEntry1] : # Back Pain\par - Will re try Cyclobenzaprine- daughter thinks it has worked in the past\par - Will also RX Tramadol PRN\par \par Re start PT with [facility]\par \par Trial of Myrbetriq for OAB- if that isn't covered or is too expensive, will try Ditropan

## 2022-02-07 NOTE — HISTORY OF PRESENT ILLNESS
[Home] : at home, [unfilled] , at the time of the visit. [Medical Office: ([facility])___] : at the medical office located in  [Verbal consent obtained from patient] : the patient, [unfilled] [FreeTextEntry1] : 89 y/o F w/ PMH COPD, recent COVID now on home 02, choledocholithiasis s/p stent '13, recent UTI, OP w/ compression fractures who presents today to establish primary palliative care. \par \par Walks extremely slowly with a walker. Very difficult to get OOB. Sleeps throughout the day and at night. Good appetite but small portions. Has back pain 2/2 compression fractures 2/2 OP for which she takes ES Tylenol- helps somewhat.  \par \par Lately has not been constipation or GERD, which have always been chronic issues for her.\par \par Biggest issue is urinary frequency. Goes every 2 hours. Has been like this for years. She used to get up on her own, but now needs assistance of her daughter. \par \par Patient feels bothered by her difficulty walking. Is waiting on PT to re-start with [facility].

## 2022-02-07 NOTE — PHYSICAL EXAM
[General Appearance - Alert] : alert [General Appearance - In No Acute Distress] : in no acute distress [Sclera] : the sclera and conjunctiva were normal [Normal Oral Mucosa] : normal oral mucosa [Neck Appearance] : the appearance of the neck was normal [] : no respiratory distress [Affect] : the affect was normal

## 2022-03-02 ENCOUNTER — NON-APPOINTMENT (OUTPATIENT)
Age: 87
End: 2022-03-02

## 2022-03-23 PROBLEM — M62.830 MUSCLE SPASM OF BACK: Status: ACTIVE | Noted: 2021-07-30

## 2022-03-23 NOTE — HISTORY OF PRESENT ILLNESS
[Verbal consent obtained from patient] : the patient, [unfilled] [Home] : at home, [unfilled] , at the time of the visit. [Medical Office: ([facility])___] : at the medical office located in  [Family Member] : family member [FreeTextEntry3] : daughter Nan [FreeTextEntry1] : 89 y/o F w/ PMH COPD, recent COVID now on home 02, choledocholithiasis s/p stent '13, recent UTI, OP w/ compression fractures who presents today to for a primary palliative care follow up. \par \par Per daughter, patient is doing "excellent." Has not used oxygen in a while. Recent resting pulse ox was 96%. PT is supposed to come tomorrow. \par \par Back pain is completely gone. Did take the muscle relaxer but no longer relieves it. \par \par Burning on urination is gone. Never tried Myrbetriq, frequency is about the same but is not bothersome to patient or daughter. \par \par Much more alert during the day. \par \par Constipation is under control with "stool softener" and mineral oil. \par \par

## 2022-03-31 PROBLEM — L03.818 CELLULITIS OF OTHER SPECIFIED SITE: Status: ACTIVE | Noted: 2022-01-01

## 2022-03-31 NOTE — ASSESSMENT
[FreeTextEntry1] : wound care\par saline wash 3 x a day\par bactroban TID\par nonadherent gauze with kerlex \par avoid tape\par to contact us if worsening pain, fever, redenss or puss\par f/u home visit with charlie next week\par rx sent to pharmacy in case worsens \par to call with questions before giving abx [Medication Management] : medication management

## 2022-03-31 NOTE — HISTORY OF PRESENT ILLNESS
[FreeTextEntry1] : received a call from pt daughter \par \par jimy fernandez \par pt with large wound from nail scratch on shin \par \par no fever, no chills, no discharge \par doing well no other changes in condition \par \par

## 2022-04-06 NOTE — PHYSICAL EXAM
[FreeTextEntry1] : Wound to left lateral shin visualized- round and covered in 30$ eschar; remainder of wound is granulation tissue/slough. Mosit wound base.

## 2022-05-04 PROBLEM — S81.802A WOUND OF LEFT LOWER EXTREMITY: Status: ACTIVE | Noted: 2022-01-01

## 2022-05-04 NOTE — PHYSICAL EXAM
[FreeTextEntry1] : Wound to left lateral shin visualized- covered in thick scab of dried Triad. Moistened and removed. Base pink and moist with minimal yellow drainage. No surrounding erythemaMosit wound base.

## 2022-05-04 NOTE — ASSESSMENT
[FreeTextEntry1] : \par Saline + Bactroban + Allevyn pads\par \par D/w daughter who voiced understanding\par

## 2022-05-04 NOTE — HISTORY OF PRESENT ILLNESS
[FreeTextEntry1] : 89 y/o F w/ PMH COPD, recent COVID s/p home 02, choledocholithiasis s/p stent '13, recent UTI, OP w/ compression fractures, recent LLE  wound treated for cellulitis + local wound care who presents today for a home visit f/u of LLE wound. \par \par Doing well, no pain, breathing controlled. No new symptoms.

## 2022-05-09 PROBLEM — G20 PARKINSON'S DISEASE: Status: ACTIVE | Noted: 2020-10-27

## 2022-05-09 PROBLEM — T14.8XXA WOUND OF SKIN: Status: ACTIVE | Noted: 2022-01-01

## 2022-05-09 NOTE — PHYSICAL EXAM
[Affect] : the affect was normal [General Appearance - Alert] : alert [General Appearance - In No Acute Distress] : in no acute distress [Sclera] : the sclera and conjunctiva were normal [Normal Oral Mucosa] : normal oral mucosa [No Oral Pallor] : no oral pallor [No Oral Cyanosis] : no oral cyanosis [Outer Ear] : the ears and nose were normal in appearance [Hearing Threshold Finger Rub Not [place]] : hearing was normal [Examination Of The Oral Cavity] : the lips and gums were normal [Nasal Cavity] : the nasal mucosa and septum were normal [Oropharynx] : The oropharynx was normal [Neck Appearance] : the appearance of the neck was normal [Respiration, Rhythm And Depth] : normal respiratory rhythm and effort [Exaggerated Use Of Accessory Muscles For Inspiration] : no accessory muscle use [Auscultation Breath Sounds / Voice Sounds] : lungs were clear to auscultation bilaterally [Heart Rate And Rhythm] : heart rate was normal and rhythm regular [Heart Sounds] : normal S1 and S2 [Full Pulse] : the pedal pulses are present [Edema] : there was no peripheral edema [Bowel Sounds] : normal bowel sounds [Abdomen Soft] : soft [Abdomen Tenderness] : non-tender [] : no hepato-splenomegaly [Abdomen Mass (___ Cm)] : no abdominal mass palpated [Nail Clubbing] : no clubbing  or cyanosis of the fingernails [Involuntary Movements] : no involuntary movements were seen [Cranial Nerves] : cranial nerves 2-12 were intact [Oriented To Time, Place, And Person] : oriented to person, place, and time [Impaired Insight] : insight and judgment were intact [FreeTextEntry1] : + wound on right shin healing well with new eechymosis non tender no edema

## 2022-05-09 NOTE — ASSESSMENT
[Limited] : Treatment Guidelines: Limited [______] : HCP: [unfilled] [Frailty-weight loss of >5%] : frailty-weight loss  [Fall precautions] : fall precautions [Social support] : social support [Living arrangements] : living arrangements [Advanced Directives] : advanced directives [Pain Management] : pain management [Medication Management] : medication management [DNR] : Code Status: DNR [Comfort] : Treatment Guidelines: Comfort [DNI] : Intubation: DNI [FreeTextEntry1] : pt is a 89 y/o F with COPD not on oxygen valvular heart disease,  moderate dementia home bound with wound of LE seen in the home for wound \par \par VNS services d/c pt due to PT services\par to continue triad cream with non adherent gauze and wrap with rolled gauze to prevent adhesive injury as pt with fraility of skin\par f/u in one week as pt to be switched to bactroban and d/c triad will continue for this week and d/c next week\par no pain \par continue PT\par \par f/u in one week for wound care \par ER precautions

## 2022-06-01 NOTE — ASSESSMENT
[DNR] : Code Status: DNR [Limited] : Treatment Guidelines: Limited [DNI] : Intubation: DNI [______] : HCP: [unfilled]

## 2022-06-01 NOTE — PHYSICAL EXAM
[General Appearance - Alert] : alert [General Appearance - In No Acute Distress] : in no acute distress [Normal Oral Mucosa] : normal oral mucosa [No Oral Pallor] : no oral pallor [Neck Appearance] : the appearance of the neck was normal [FreeTextEntry1] : Wound to left lateral shin visualized- covered in thick scab of dried Triad. Moistened and removed. Base pink and moist with minimal yellow drainage. No surrounding erythemaMosit wound base.  [Affect] : the affect was normal

## 2022-06-01 NOTE — HISTORY OF PRESENT ILLNESS
[FreeTextEntry1] : 89 y/o F w/ PMH COPD, recent COVID s/p home 02, choledocholithiasis s/p stent '13, recent UTI, OP w/ compression fractures, recent LLE  wound treated for cellulitis + local wound care who presents today for a primary palliative care f/u\par

## 2022-08-19 PROBLEM — R41.0 CONFUSION: Status: ACTIVE | Noted: 2022-01-01

## 2022-08-19 PROBLEM — R35.0 URINARY FREQUENCY: Status: ACTIVE | Noted: 2022-01-01

## 2022-08-19 PROBLEM — N32.81 OVERACTIVE BLADDER: Status: ACTIVE | Noted: 2022-02-07

## 2022-08-22 NOTE — HISTORY OF PRESENT ILLNESS
[FreeTextEntry1] : pt seen in the home with her daughter jimy present\par pt denies pain \par reprots has new bruise on LE wound\par eating well\par no shortness of breath \par no other complaints at this time\par doing PT in the home\par denies trauma, falls \par no changes in mental status\par daughter concerned as pt wound healing well and now with new bruising denies trauma, has only been using triad with [facility]\par \par \par F/U 08/19/22\par pt seen in the home with her daughter Jimy present\par pt daughter reports pt with lethargy, confusion, dysuria and polyuria \par decreased appetite\par increased weakness\par pt has been participating in rehab in the home\par no other concerns at this time\par  no fever, no chills\par pt reports feeling sleepy and with worsening dysuria \par last time felt this had"really bad infection in my urine"

## 2022-08-22 NOTE — ASSESSMENT
[FreeTextEntry1] : pt with history of COPD, history of COVID infection with hypoxia and required oxygen\par currently doing well in the home, PPS of 60% \par with symptoms of complicated UTI \par \par will treat empirically for UTI given pt with confusion, lethargy, and dysuria x 1 week worsening\par will obtain labs cbc/cmp and urine studies\par abx with bactrim bid x 7 days \par increase fluid intake\par Pt kendall [place] is HCP\par ER precautions given\par  [Frailty-weight loss of >5%] : frailty-weight loss  [Fall precautions] : fall precautions [Social support] : social support [Living arrangements] : living arrangements [Advanced Directives] : advanced directives [Lab Results] : lab results [Pain Management] : pain management [Medication Management] : medication management

## 2022-08-22 NOTE — PHYSICAL EXAM
[General Appearance - Alert] : alert [General Appearance - In No Acute Distress] : in no acute distress [FreeTextEntry1] : /65 HR 76 RR 16 Spo2 94%  frail [Sclera] : the sclera and conjunctiva were normal [Normal Oral Mucosa] : normal oral mucosa [No Oral Pallor] : no oral pallor [No Oral Cyanosis] : no oral cyanosis [Outer Ear] : the ears and nose were normal in appearance [Hearing Threshold Finger Rub Not [place]] : hearing was normal [Examination Of The Oral Cavity] : the lips and gums were normal [Nasal Cavity] : the nasal mucosa and septum were normal [Oropharynx] : The oropharynx was normal [Neck Appearance] : the appearance of the neck was normal [Jugular Venous Distention Increased] : there was no jugular-venous distention [Respiration, Rhythm And Depth] : normal respiratory rhythm and effort [Exaggerated Use Of Accessory Muscles For Inspiration] : no accessory muscle use [Auscultation Breath Sounds / Voice Sounds] : lungs were clear to auscultation bilaterally [Heart Rate And Rhythm] : heart rate was normal and rhythm regular [Heart Sounds] : normal S1 and S2 [Murmurs] : no murmurs [Full Pulse] : the pedal pulses are present [Edema] : there was no peripheral edema [Veins - Varicosity Changes] : there were no varicosital changes [Bowel Sounds] : normal bowel sounds [Abdomen Soft] : soft [Abdomen Tenderness] : non-tender [Abdomen Mass (___ Cm)] : no abdominal mass palpated [Nail Clubbing] : no clubbing  or cyanosis of the fingernails [Involuntary Movements] : no involuntary movements were seen [Motor Tone] : muscle strength and tone were normal [Skin Color & Pigmentation] : normal skin color and pigmentation [Skin Turgor] : normal skin turgor [] : no rash [Skin Lesions] : no skin lesions [Cranial Nerves] : cranial nerves 2-12 were intact [Deep Tendon Reflexes (DTR)] : deep tendon reflexes were 2+ and symmetric [Sensation] : the sensory exam was normal to light touch and pinprick [Oriented To Time, Place, And Person] : oriented to person, place, and time

## 2022-08-23 PROBLEM — R06.2 WHEEZING ON EXPIRATION: Status: ACTIVE | Noted: 2021-03-30

## 2022-08-23 NOTE — PHYSICAL EXAM
[General Appearance - Alert] : alert [General Appearance - In No Acute Distress] : in no acute distress [Normal Oral Mucosa] : normal oral mucosa [No Oral Pallor] : no oral pallor [Hearing Threshold Finger Rub Not [place]] : hearing was normal [Heart Sounds] : normal S1 and S2 [Murmurs] : no murmurs [Edema] : there was no peripheral edema [Oriented To Time, Place, And Person] : oriented to person, place, and time [FreeTextEntry1] : Tachypneic and wheezing at rest. Lungs clear of crackles

## 2022-08-23 NOTE — HISTORY OF PRESENT ILLNESS
[FreeTextEntry1] : 89 y/o F w/ PMH COPD, cholangitis and liver abscess s/p percutaneous drainage '17, HFpEF c/b  b/l pleural effusions s/p thoracentesis, hyponatremia, c. diff, GIB 2/2 large hiatal hernia '19, recent COVID s/p home 02, choledocholithiasis s/p stent '13, recent UTI, OP w/ compression fractures, recent LLE wound treated for cellulitis who presents today for primary palliative care follow up. \par \par Echo '18 --> diastolic dysfunction, severe TR, mod-severe pHTN\par \par She was seen by Dr. Moon on 8/19 and treated empirically for a UTI; yesterday she was hypoxic and dyspneic, and ABX were broadened to cover for PNA. She has had two doses of antibiotics so far and reports feeling much better and less weak.\par \par Labs from yesterday normal for Na 130 + Pro-BNP of 2,139 in the setting of normal renal function.

## 2022-08-23 NOTE — ASSESSMENT
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

## 2022-08-23 NOTE — ADDENDUM
[FreeTextEntry1] : 20 mins of prolonged services was spent discussing plan of care, CHF, PNA, UTI with patient's daughter

## 2022-09-10 NOTE — ASSESSMENT
[Frailty-weight loss of >5%] : frailty-weight loss  [Fall precautions] : fall precautions [Social support] : social support [Living arrangements] : living arrangements [Advanced Directives] : advanced directives [Lab Results] : lab results [Pain Management] : pain management [Medication Management] : medication management [Completed Healthcare Proxy] : completed healthcare proxy [Completed DNR] : completed DNR [DNR] : Code Status: DNR [DNI] : Intubation: DNI [FreeTextEntry1] : pt with history of COPD, history of COVID infection with hypoxia and required oxygen\par currently doing well in the home, PPS of 60% with episodes of chest pressure\par \par discussed with pt and daughter likely cardiac etiology \par pt adamantly declining any medications or further work up\par pt reports " I am ready to die, i lived a good life, I m not able to do anything anymore and I feel sick"\par discussed may add ASA and beta blocker--pt refusing\par discussed with pt daughter, who wants to pt take medications--to continue discussions regarding GOC\par DNR/DNI\par pt requesting to return home with HHA--"i want to go home, I feel guilty staying here with my daughter, everything revolves around me" \par pt agrees for labs, wants to continue PT\par will obtain labs cbc/cmp/BNP\par \par Pt daughter Nan is HCP\par ER precautions given\par \par \par f/u in one month

## 2022-09-10 NOTE — HISTORY OF PRESENT ILLNESS
[FreeTextEntry1] : pt seen in the home with her daughter jimy present\par pt denies pain \par reprots has new bruise on LE wound\par eating well\par no shortness of breath \par no other complaints at this time\par doing PT in the home\par denies trauma, falls \par no changes in mental status\par daughter concerned as pt wound healing well and now with new bruising denies trauma, has only been using triad with [facility]\par \par \par F/U 08/19/22\par pt seen in the home with her daughter Jimy present\par pt daughter reports pt with lethargy, confusion, dysuria and polyuria \par decreased appetite\par increased weakness\par pt has been participating in rehab in the home\par no other concerns at this time\par  no fever, no chills\par \par Follow up 9/8/22\par \par pt seen in the home with her daughter jimy present\par pt sitting comfortably in the chair\par reports inreased fatigued, sleeping more hours\par "feels sick" has had multiple episodes of "panic/scare:" with associated chest pressure--"reports been happening for a long time, i never mentioned anything" \par increased work of breathing\par has been able to participate in PT \par + bm\par no urinary symptoms--improved since last visit\par

## 2022-09-10 NOTE — PHYSICAL EXAM
[General Appearance - Alert] : alert [General Appearance - In No Acute Distress] : in no acute distress [Sclera] : the sclera and conjunctiva were normal [Extraocular Movements] : extraocular movements were intact [Normal Oral Mucosa] : normal oral mucosa [No Oral Pallor] : no oral pallor [No Oral Cyanosis] : no oral cyanosis [Outer Ear] : the ears and nose were normal in appearance [Hearing Threshold Finger Rub Not [place]] : hearing was normal [Examination Of The Oral Cavity] : the lips and gums were normal [Nasal Cavity] : the nasal mucosa and septum were normal [Jugular Venous Distention Increased] : there was no jugular-venous distention [Respiration, Rhythm And Depth] : normal respiratory rhythm and effort [Auscultation Breath Sounds / Voice Sounds] : lungs were clear to auscultation bilaterally [Heart Rate And Rhythm] : heart rate was normal and rhythm regular [Heart Sounds] : normal S1 and S2 [Full Pulse] : the pedal pulses are present [Edema] : there was no peripheral edema [Veins - Varicosity Changes] : there were no varicosital changes [Bowel Sounds] : normal bowel sounds [Abdomen Soft] : soft [Abdomen Tenderness] : non-tender [Abdomen Mass (___ Cm)] : no abdominal mass palpated [Nail Clubbing] : no clubbing  or cyanosis of the fingernails [Musculoskeletal - Swelling] : no joint swelling seen [Skin Color & Pigmentation] : normal skin color and pigmentation [Skin Turgor] : normal skin turgor [] : no rash [Cranial Nerves] : cranial nerves 2-12 were intact [Oriented To Time, Place, And Person] : oriented to person, place, and time [Impaired Insight] : insight and judgment were intact [Affect] : the affect was normal [FreeTextEntry1] : + murmur

## 2022-09-29 NOTE — ASSESSMENT
[DNR] : Code Status: DNR [Limited] : Treatment Guidelines: Limited [DNI] : Intubation: DNI [______] : HCP: [unfilled] [FreeTextEntry1] : - C/w home PT\par - Back pain resolved\par - Urinary frequency managed\par - Tylenol ES PRN \par - No need for home 02 at this time\par - Nebs PRN for dyspnea\par \par Discussed advanced directives with daughter, who stated that patient does not was to be resuscitated and still gives her grief for "keeping her alive from sepsis years ago." Daughter is HCP- she is only child. She feels patient has a poor QOL, all friends are dead, and is in agreement with a DNR/DNI. \par \par  [de-identified] : d Notification Instructions: Patient will be notified of biopsy results. However, patient instructed to call the office if not contacted within 2 weeks.

## 2022-10-19 PROBLEM — R63.0 ANOREXIA: Status: ACTIVE | Noted: 2018-12-05

## 2022-10-19 NOTE — ASSESSMENT
[FreeTextEntry1] : 89 y/o F w/ severe VHD, HFpEF, COPD, UTI/PNA/HFpEF in 08/22 seen for acute visit, likely has PNA\par \par - Start treatment for CAP with Zithromax/Cefpodoxime (has penicillin allergy, unclear of true allergy, daughter requesting to avoid)\par - Probiotics + fluids recommended\par - CBC, CMP, BNP, and chest X ray to be done at home

## 2022-10-19 NOTE — PHYSICAL EXAM
[General Appearance - Alert] : alert [General Appearance - In No Acute Distress] : in no acute distress [Normal Oral Mucosa] : normal oral mucosa [No Oral Pallor] : no oral pallor [Hearing Threshold Finger Rub Not [place]] : hearing was normal [Heart Sounds] : normal S1 and S2 [Murmurs] : no murmurs [Edema] : there was no peripheral edema [Oriented To Time, Place, And Person] : oriented to person, place, and time [FreeTextEntry1] : Fine crackles to RML, RLL, and LLL that do not clear with cough

## 2022-10-19 NOTE — HISTORY OF PRESENT ILLNESS
[FreeTextEntry1] : 89 y/o F w/ PMH COPD, cholangitis and liver abscess s/p percutaneous drainage '17, HFpEF c/b  b/l pleural effusions s/p thoracentesis, hyponatremia, c. diff, GIB 2/2 large hiatal hernia '19, recent COVID s/p home 02, choledocholithiasis s/p stent '13, recent UTI, OP w/ compression fractures, recent LLE wound treated for cellulitis who presents today for primary palliative acute visit for GI symptoms + dyspnea/cough.\par \par Echo '18 --> diastolic dysfunction, severe TR, mod-severe pHTN\par Treated for UTI/PNA/CHF in 08/22\par \par Pt had c/o dysuria and was given an RX for Bactrim. She received it 3/15-3/18 and dysuria has resolved. However, after starting the ABX, she started to experience anorexia, vomiting, weakness, chills, generalized "discomfort," dyspnea, and feeling like something is "stuck" in her throat. She has not been able to get out of bed for many days. \par \par No fever, pulse ox  has been in low 90s. \par \par \par \par \par

## 2022-10-31 PROBLEM — R30.0 DYSURIA: Status: ACTIVE | Noted: 2022-03-01

## 2022-11-09 PROBLEM — K64.9 HEMORRHOIDS, UNSPECIFIED HEMORRHOID TYPE: Status: ACTIVE | Noted: 2018-11-20

## 2022-12-03 NOTE — PHYSICAL EXAM
[General Appearance - Alert] : alert [General Appearance - In No Acute Distress] : in no acute distress [Sclera] : the sclera and conjunctiva were normal [Extraocular Movements] : extraocular movements were intact [Normal Oral Mucosa] : normal oral mucosa [No Oral Pallor] : no oral pallor [No Oral Cyanosis] : no oral cyanosis [Outer Ear] : the ears and nose were normal in appearance [Hearing Threshold Finger Rub Not [place]] : hearing was normal [Examination Of The Oral Cavity] : the lips and gums were normal [Nasal Cavity] : the nasal mucosa and septum were normal [Jugular Venous Distention Increased] : there was no jugular-venous distention [Respiration, Rhythm And Depth] : normal respiratory rhythm and effort [Auscultation Breath Sounds / Voice Sounds] : lungs were clear to auscultation bilaterally [Heart Rate And Rhythm] : heart rate was normal and rhythm regular [Heart Sounds] : normal S1 and S2 [FreeTextEntry1] : + murmur [Full Pulse] : the pedal pulses are present [Edema] : there was no peripheral edema [Veins - Varicosity Changes] : there were no varicosital changes [Bowel Sounds] : normal bowel sounds [Abdomen Soft] : soft [Abdomen Tenderness] : non-tender [Abdomen Mass (___ Cm)] : no abdominal mass palpated [Nail Clubbing] : no clubbing  or cyanosis of the fingernails [Musculoskeletal - Swelling] : no joint swelling seen [Skin Color & Pigmentation] : normal skin color and pigmentation [Skin Turgor] : normal skin turgor [] : no rash [Cranial Nerves] : cranial nerves 2-12 were intact [Oriented To Time, Place, And Person] : oriented to person, place, and time [Impaired Insight] : insight and judgment were intact [Affect] : the affect was normal

## 2022-12-03 NOTE — PHYSICAL EXAM
[General Appearance - Alert] : alert [General Appearance - In No Acute Distress] : in no acute distress [Sclera] : the sclera and conjunctiva were normal [Extraocular Movements] : extraocular movements were intact [Normal Oral Mucosa] : normal oral mucosa [No Oral Pallor] : no oral pallor [No Oral Cyanosis] : no oral cyanosis [Outer Ear] : the ears and nose were normal in appearance [Hearing Threshold Finger Rub Not [place]] : hearing was normal [Examination Of The Oral Cavity] : the lips and gums were normal [Nasal Cavity] : the nasal mucosa and septum were normal [Jugular Venous Distention Increased] : there was no jugular-venous distention [Respiration, Rhythm And Depth] : normal respiratory rhythm and effort [Auscultation Breath Sounds / Voice Sounds] : lungs were clear to auscultation bilaterally [Heart Rate And Rhythm] : heart rate was normal and rhythm regular [Heart Sounds] : normal S1 and S2 [FreeTextEntry1] : + murmur [Full Pulse] : the pedal pulses are present [Edema] : there was no peripheral edema [Veins - Varicosity Changes] : there were no varicosital changes [Bowel Sounds] : normal bowel sounds [Abdomen Soft] : soft [Abdomen Tenderness] : non-tender [Abdomen Mass (___ Cm)] : no abdominal mass palpated [Nail Clubbing] : no clubbing  or cyanosis of the fingernails [Musculoskeletal - Swelling] : no joint swelling seen [Skin Color & Pigmentation] : normal skin color and pigmentation [Skin Turgor] : normal skin turgor [] : no rash [Cranial Nerves] : cranial nerves 2-12 were intact [Oriented To Time, Place, And Person] : oriented to person, place, and time [Impaired Insight] : insight and judgment were intact [Affect] : the affect was normal

## 2022-12-03 NOTE — ASSESSMENT
[FreeTextEntry1] : pt with history of COPD, history of COVID infection with hypoxia and required oxygen\par currently doing well in the home, PPS of 60% with episodes of chest pressure\par \par discussed with pt and daughter likely cardiac etiology \par pt adamantly declining any medications or further work up\par pt reports " I am ready to die, i lived a good life, I m not able to do anything anymore and I feel sick"\par discussed may add ASA and beta blocker--pt refusing\par discussed with pt daughter, who wants to pt take medications--to continue discussions regarding GOC\par DNR/DNI\par \par Pt daughter Nan is HCP\par \par dysuria-no UTI on labs, external/internal hemmorhoid\par lidocaine-hydrocortisoine-witchhazel pads\par ER precautions given\par \par \par f/u in one month [Frailty-weight loss of >5%] : frailty-weight loss  [Fall precautions] : fall precautions [Social support] : social support [Advanced Directives] : advanced directives [Lab Results] : lab results [Pain Management] : pain management [Medication Management] : medication management

## 2022-12-06 PROBLEM — K59.09 CHRONIC CONSTIPATION: Status: ACTIVE | Noted: 2022-01-01

## 2022-12-06 PROBLEM — J18.9 COMMUNITY ACQUIRED PNEUMONIA: Status: ACTIVE | Noted: 2022-01-01

## 2022-12-06 NOTE — PHYSICAL EXAM
[General Appearance - Alert] : alert [Sclera] : the sclera and conjunctiva were normal [Extraocular Movements] : extraocular movements were intact [Normal Oral Mucosa] : normal oral mucosa [No Oral Pallor] : no oral pallor [No Oral Cyanosis] : no oral cyanosis [Outer Ear] : the ears and nose were normal in appearance [Hearing Threshold Finger Rub Not [place]] : hearing was normal [Examination Of The Oral Cavity] : the lips and gums were normal [Nasal Cavity] : the nasal mucosa and septum were normal [Jugular Venous Distention Increased] : there was no jugular-venous distention [Respiration, Rhythm And Depth] : normal respiratory rhythm and effort [Auscultation Breath Sounds / Voice Sounds] : lungs were clear to auscultation bilaterally [Heart Rate And Rhythm] : heart rate was normal and rhythm regular [Heart Sounds] : normal S1 and S2 [Full Pulse] : the pedal pulses are present [Edema] : there was no peripheral edema [Veins - Varicosity Changes] : there were no varicosital changes [Bowel Sounds] : normal bowel sounds [Abdomen Soft] : soft [Abdomen Tenderness] : non-tender [Abdomen Mass (___ Cm)] : no abdominal mass palpated [Nail Clubbing] : no clubbing  or cyanosis of the fingernails [Musculoskeletal - Swelling] : no joint swelling seen [Skin Color & Pigmentation] : normal skin color and pigmentation [Skin Turgor] : normal skin turgor [] : no rash [Cranial Nerves] : cranial nerves 2-12 were intact [Oriented To Time, Place, And Person] : oriented to person, place, and time [Impaired Insight] : insight and judgment were intact [Affect] : the affect was normal [FreeTextEntry1] : + murmur

## 2022-12-06 NOTE — ASSESSMENT
[Frailty-weight loss of >5%] : frailty-weight loss  [Fall precautions] : fall precautions [Social support] : social support [Living arrangements] : living arrangements 3-point gait [Advanced Directives] : advanced directives [Lab Results] : lab results [Pain Management] : pain management [Medication Management] : medication management [DNR] : Code Status: DNR [Comfort] : Treatment Guidelines: Comfort [DNI] : Intubation: DNI [FreeTextEntry1] : pt with HFpEF, valvular disease, COPD with acute CAP and increased shortness of breath currently dependent on oxygen 2 L with persistent weight loss, and decrease in pps from 60% to 40% \par \par DNR/DNI :[facility]OL[facility] reviewed\par pt and daughter do not want hospitalization \par agree for hospice referral as pt cardiac and pulmonary condition continue to progress\par pt adamantly declining any medications or further work up\par pt reports " I am ready to die, i lived a good life, I m not able to do anything anymore and I feel sick"\par will do basic labs and viral panel \par continue oxygen 2 L via nasal cannula\par pt very anxious, will give 2.5 mg diazepam QHS discussed opioids for dyspnea, due to contstipation will hold at this time\par guaifenesin 400 mg TID \par complete abx course on d3/5 of levaquin \par \par Pt daughter Nan is HCP\par ER precautions given\par \par f/u pending labs--cbc/cmp/bnp/viral panel/magnesium/phos\par hospice referral [de-identified] : [facility]

## 2022-12-06 NOTE — HISTORY OF PRESENT ILLNESS
[FreeTextEntry1] : pt seen in the home with her daughter jimy present\par pt denies pain \par reprots has new bruise on LE wound\par eating well\par no shortness of breath \par no other complaints at this time\par doing PT in the home\par denies trauma, falls \par no changes in mental status\par daughter concerned as pt wound healing well and now with new bruising denies trauma, has only been using triad with [facility]\par \par \par F/U 08/19/22\par pt seen in the home with her daughter Jimy present\par pt daughter reports pt with lethargy, confusion, dysuria and polyuria \par decreased appetite\par increased weakness\par pt has been participating in rehab in the home\par no other concerns at this time\par  no fever, no chills\par \par Follow up 9/8/22\par \par pt seen in the home with her daughter jimy present\par pt sitting comfortably in the chair\par reports inreased fatigued, sleeping more hours\par "feels sick" has had multiple episodes of "panic/scare:" with associated chest pressure--"reports been happening for a long time, i never mentioned anything" \par increased work of breathing\par has been able to participate in PT \par + bm\par no urinary symptoms--improved since last visit\par \par \par 12/5/22\par pt seen and examined in the home. pt lying comfortably in bed with noted labored breathing oxygen nasal cannula in place. pt ambulating with assistance now. pt daughter reports episodes of hypoxia over weekend. Pt prescribed rx with improvement. pt reports she feels better but "not hungry" .we discussed possible hospitalization pt and pt daughter do not want hospitalization. pt reports "this is no way to live, I am ok if God takes me" pt daughter tearful appropriately greiving. + bm, tolerating PO, + urine, no pain no palpitations, no fever.\par pt daughter reports she and pt son in law have also had a respiratory illness, however COVID negative

## 2023-01-01 ENCOUNTER — APPOINTMENT (OUTPATIENT)
Dept: GERIATRICS | Facility: HOME HEALTH | Age: 88
End: 2023-01-01

## 2023-01-01 ENCOUNTER — APPOINTMENT (OUTPATIENT)
Dept: GERIATRICS | Facility: CLINIC | Age: 88
End: 2023-01-01
Payer: MEDICARE

## 2023-01-01 ENCOUNTER — APPOINTMENT (OUTPATIENT)
Dept: GERIATRICS | Facility: CLINIC | Age: 88
End: 2023-01-01

## 2023-01-01 ENCOUNTER — APPOINTMENT (OUTPATIENT)
Dept: GERIATRICS | Facility: HOME HEALTH | Age: 88
End: 2023-01-01
Payer: MEDICARE

## 2023-01-01 DIAGNOSIS — Z71.89 OTHER SPECIFIED COUNSELING: ICD-10-CM

## 2023-01-01 DIAGNOSIS — J44.9 CHRONIC OBSTRUCTIVE PULMONARY DISEASE, UNSPECIFIED: ICD-10-CM

## 2023-01-01 DIAGNOSIS — G89.29 OTHER CHRONIC PAIN: ICD-10-CM

## 2023-01-01 DIAGNOSIS — T14.8XXA OTHER INJURY OF UNSPECIFIED BODY REGION, INITIAL ENCOUNTER: ICD-10-CM

## 2023-01-01 DIAGNOSIS — I38 ENDOCARDITIS, VALVE UNSPECIFIED: ICD-10-CM

## 2023-01-01 DIAGNOSIS — I50.9 HEART FAILURE, UNSPECIFIED: ICD-10-CM

## 2023-01-01 PROCEDURE — 99441: CPT

## 2023-01-01 PROCEDURE — 99350 HOME/RES VST EST HIGH MDM 60: CPT | Mod: GV

## 2023-01-01 RX ORDER — FENTANYL 12 UG/H
12 PATCH, EXTENDED RELEASE TRANSDERMAL
Qty: 5 | Refills: 0 | Status: ACTIVE | COMMUNITY
Start: 2023-01-01 | End: 1900-01-01

## 2023-03-30 ENCOUNTER — APPOINTMENT (OUTPATIENT)
Dept: GERIATRICS | Facility: CLINIC | Age: 88
End: 2023-03-30

## 2023-04-16 PROBLEM — Z71.89 COUNSELING REGARDING ADVANCE DIRECTIVES AND GOALS OF CARE: Status: ACTIVE | Noted: 2022-01-01

## 2023-04-16 PROBLEM — G89.29 OTHER CHRONIC PAIN: Status: ACTIVE | Noted: 2018-11-20

## 2023-04-16 PROBLEM — I38 VALVULAR HEART DISEASE: Status: ACTIVE | Noted: 2022-01-01

## 2023-04-16 PROBLEM — J44.9 COPD (CHRONIC OBSTRUCTIVE PULMONARY DISEASE): Status: ACTIVE | Noted: 2018-11-20

## 2023-04-16 PROBLEM — T14.8XXA ACUTE FRACTURE: Status: ACTIVE | Noted: 2023-01-01

## 2023-04-16 PROBLEM — I50.9 CONGESTIVE HEART FAILURE: Status: ACTIVE | Noted: 2022-01-01

## 2023-04-16 NOTE — HISTORY OF PRESENT ILLNESS
[FreeTextEntry1] : pt seen in the home with her daughter jimy present\par pt denies pain \par reprots has new bruise on LE wound\par eating well\par no shortness of breath \par no other complaints at this time\par doing PT in the home\par denies trauma, falls \par no changes in mental status\par daughter concerned as pt wound healing well and now with new bruising denies trauma, has only been using triad with [facility]\par \par \par F/U 08/19/22\par pt seen in the home with her daughter Jimy present\par pt daughter reports pt with lethargy, confusion, dysuria and polyuria \par decreased appetite\par increased weakness\par pt has been participating in rehab in the home\par no other concerns at this time\par  no fever, no chills\par \par Follow up 9/8/22\par \par pt seen in the home with her daughter jimy present\par pt sitting comfortably in the chair\par reports inreased fatigued, sleeping more hours\par "feels sick" has had multiple episodes of "panic/scare:" with associated chest pressure--"reports been happening for a long time, i never mentioned anything" \par increased work of breathing\par has been able to participate in PT \par + bm\par no urinary symptoms--improved since last visit\par \par \par 12/5/22\par pt seen and examined in the home. pt lying comfortably in bed with noted labored breathing oxygen nasal cannula in place. pt ambulating with assistance now. pt daughter reports episodes of hypoxia over weekend. Pt prescribed rx with improvement. pt reports she feels better but "not hungry" .we discussed possible hospitalization pt and pt daughter do not want hospitalization. pt reports "this is no way to live, I am ok if God takes me" pt daughter tearful appropriately greiving. + bm, tolerating PO, + urine, no pain no palpitations, no fever.\par pt daughter reports she and pt son in law have also had a respiratory illness, however COVID negative\par \par \par 03/20/23\par pt s/p fall with acute comminuted fracture with severe pain, does not want sx, pt currently on hospice will manage pain as tolerated\par pt minimally responsive

## 2023-04-16 NOTE — HISTORY OF PRESENT ILLNESS
[FreeTextEntry1] : pt seen in the home with her daughter jimy present\par pt denies pain \par reprots has new bruise on LE wound\par eating well\par no shortness of breath \par no other complaints at this time\par doing PT in the home\par denies trauma, falls \par no changes in mental status\par daughter concerned as pt wound healing well and now with new bruising denies trauma, has only been using triad with [facility]\par \par \par F/U 08/19/22\par pt seen in the home with her daughter Jimy present\par pt daughter reports pt with lethargy, confusion, dysuria and polyuria \par decreased appetite\par increased weakness\par pt has been participating in rehab in the home\par no other concerns at this time\par  no fever, no chills\par \par Follow up 9/8/22\par \par pt seen in the home with her daughter jimy present\par pt sitting comfortably in the chair\par reports inreased fatigued, sleeping more hours\par "feels sick" has had multiple episodes of "panic/scare:" with associated chest pressure--"reports been happening for a long time, i never mentioned anything" \par increased work of breathing\par has been able to participate in PT \par + bm\par no urinary symptoms--improved since last visit\par \par \par 12/5/22\par pt seen and examined in the home. pt lying comfortably in bed with noted labored breathing oxygen nasal cannula in place. pt ambulating with assistance now. pt daughter reports episodes of hypoxia over weekend. Pt prescribed rx with improvement. pt reports she feels better but "not hungry" .we discussed possible hospitalization pt and pt daughter do not want hospitalization. pt reports "this is no way to live, I am ok if God takes me" pt daughter tearful appropriately greiving. + bm, tolerating PO, + urine, no pain no palpitations, no fever.\par pt daughter reports she and pt son in law have also had a respiratory illness, however COVID negative\par \par 03/15/23\par pt s/p fall in acute pain, does not want surgical intervention \par comfort measures only pain management\par minimal PO intake \par hypoxia on NC 2 liters \par \par 03/20/23\par pt s/p fall with acute comminuted fracture with severe pain, does not want sx, pt currently on hospice will manage pain as tolerated\par pt minimally responsive

## 2023-04-16 NOTE — PHYSICAL EXAM
[Sclera] : the sclera and conjunctiva were normal [Extraocular Movements] : extraocular movements were intact [Jugular Venous Distention Increased] : there was no jugular-venous distention [Heart Sounds] : normal S1 and S2 [Full Pulse] : the pedal pulses are present [Edema] : there was no peripheral edema [Veins - Varicosity Changes] : there were no varicosital changes [Abdomen Soft] : soft [Bowel Sounds] : normal bowel sounds [Abdomen Tenderness] : non-tender [Abdomen Mass (___ Cm)] : no abdominal mass palpated [FreeTextEntry1] : + cyanosis, + shortnening of leg [Skin Color & Pigmentation] : normal skin color and pigmentation [Skin Turgor] : normal skin turgor [] : no rash

## 2023-04-16 NOTE — ASSESSMENT
[FreeTextEntry1] : pt with HFpEF, valvular disease, COPD with acute acute hip fracture s/p fall on home hospice pps 20% declining rapidily in severe pain imminently dying \par \par DNR/DNI :MOLST reviewed\par pt and daughter do not want hospitalization or surgery--base don pt prior known wishes and discussions with hospice team as well\par pt adamantly declining further work up, agreed to xray did not want sx\par pt reported" I am ready to die, i lived a good life, I m not able to do anything anymore and I feel sick"\par continue oxygen 2 L via nasal cannula\par feantanyl patch and morphine and ativcan for pain and agiation\par discussed in detail with hospice team\par pt imminently dying \par discussed with pt daughter and emotional support provided\par Extensive discussion held with family regarding pt prognosis of hours to days  . ACD/GOC discussed in detail, DNR/DNI, no hospitalization, no IV abx/IVF/artificial nutriton, comfort measures only. end of life symptoms discussed including signs of cardiac and pulmonary failure, symptoms associated with discomfort and education on medications for comfort as symptoms progress.\par \par \par Pt daughter Nan is HCP\par  [Depression] : depression [Frailty-weight loss of >5%] : frailty-weight loss  [Fall precautions] : fall precautions [Social support] : social support [Living arrangements] : living arrangements [Advanced Directives] : advanced directives [Lab Results] : lab results [Pain Management] : pain management [Medication Management] : medication management [DNR] : Code Status: DNR [DNI] : Intubation: DNI [Comfort] : Treatment Guidelines: Comfort

## 2023-04-16 NOTE — ASSESSMENT
[FreeTextEntry1] : pt with HFpEF, valvular disease, COPD with acute CAP and increased shortness of breath currently dependent on oxygen 2 L with persistent weight loss, and decrease in pps from 60% to 40% s/p fall with comminuted fracture of femoral head pps of 30%\par \par DNR/DNI :MOLST reviewed\par pt and daughter do not want hospitalization \par agree for hospice referral as pt cardiac and pulmonary condition continue to progress\par pt adamantly declining any medications or further work up\par pt reports " I am ready to die, i lived a good life, I m not able to do anything anymore and I feel sick"\par continue oxygen 2 L via nasal cannula\par pt very anxious, will give ativan QHS discussed opioids for pain and dyspnea monitor bowel movement\par started on fentanly patch due to pain , has been taking morphine TID\par discussed with hospice \par \par Pt daughter Nan is HCP, discussed pt will continue to decline prognosis of weeks may  decline more rapidly depending on pain levels and PO intake, and oxygen requirements. emotional support to daughter confirmed DNR/DNI no hospitalizations comfort measures only\par  [Fall precautions] : fall precautions [Frailty-weight loss of >5%] : frailty-weight loss  [Social support] : social support [Living arrangements] : living arrangements [Advanced Directives] : advanced directives [Lab Results] : lab results [Pain Management] : pain management [Medication Management] : medication management [DNR] : Code Status: DNR [Comfort] : Treatment Guidelines: Comfort [DNI] : Intubation: DNI

## 2023-04-17 ENCOUNTER — RX RENEWAL (OUTPATIENT)
Age: 88
End: 2023-04-17

## 2023-04-17 RX ORDER — ESOMEPRAZOLE MAGNESIUM 40 MG/1
40 CAPSULE, DELAYED RELEASE ORAL
Qty: 180 | Refills: 3 | Status: DISCONTINUED | COMMUNITY
Start: 2019-05-20 | End: 2023-04-17

## 2025-01-27 NOTE — HISTORY OF PRESENT ILLNESS
Pt's spouse called stating he received a bill and EOB for pt's 12/19/24 appt due to appt being converted to virtual. Pt's insurance denied claim citing they do not cover telehealth appts. Would like guidance on how to have this changed or repealed. Call back number is [phone].   [FreeTextEntry1] : 89 y/o F w/ PMH COPD, recent COVID now on home 02, choledocholithiasis s/p stent '13, recent UTI, OP w/ compression fractures who presents today to for a primary palliative care acute visit for LLE wound follow up. \par \par Per daughter, foot is now swollen but otherwise wound is "better." No fever. Feels well overall. Wound is not warm. Little exudate per daughter. \par  [FreeTextEntry3] : Daughter Nan